# Patient Record
Sex: FEMALE
[De-identification: names, ages, dates, MRNs, and addresses within clinical notes are randomized per-mention and may not be internally consistent; named-entity substitution may affect disease eponyms.]

---

## 2019-06-02 ENCOUNTER — HOSPITAL ENCOUNTER (EMERGENCY)
Dept: HOSPITAL 42 - ED | Age: 34
Discharge: LEFT BEFORE DISCHARGE | End: 2019-06-02
Payer: COMMERCIAL

## 2019-06-02 VITALS — TEMPERATURE: 98.6 F

## 2019-06-02 VITALS
OXYGEN SATURATION: 100 % | HEART RATE: 82 BPM | RESPIRATION RATE: 16 BRPM | SYSTOLIC BLOOD PRESSURE: 143 MMHG | DIASTOLIC BLOOD PRESSURE: 92 MMHG

## 2019-06-02 VITALS — BODY MASS INDEX: 38.4 KG/M2

## 2019-06-02 DIAGNOSIS — M54.9: ICD-10-CM

## 2019-06-02 DIAGNOSIS — F17.210: ICD-10-CM

## 2019-06-02 DIAGNOSIS — I10: ICD-10-CM

## 2019-06-02 DIAGNOSIS — R10.13: Primary | ICD-10-CM

## 2019-06-02 LAB
ALBUMIN SERPL-MCNC: 4.5 G/DL (ref 3–4.8)
ALBUMIN/GLOB SERPL: 1.4 {RATIO} (ref 1.1–1.8)
ALT SERPL-CCNC: 34 U/L (ref 7–56)
APPEARANCE UR: (no result)
APTT BLD: 31.7 SECONDS (ref 26.9–38.3)
AST SERPL-CCNC: 25 U/L (ref 14–36)
BACTERIA #/AREA URNS HPF: (no result) /HPF
BASOPHILS # BLD AUTO: 0.01 K/MM3 (ref 0–2)
BASOPHILS NFR BLD: 0.1 % (ref 0–3)
BILIRUB UR-MCNC: NEGATIVE MG/DL
BUN SERPL-MCNC: 11 MG/DL (ref 7–21)
CALCIUM SERPL-MCNC: 9.2 MG/DL (ref 8.4–10.5)
COLOR UR: YELLOW
EOSINOPHIL # BLD: 0.2 10*3/UL (ref 0–0.7)
EOSINOPHIL NFR BLD: 2.1 % (ref 1.5–5)
ERYTHROCYTE [DISTWIDTH] IN BLOOD BY AUTOMATED COUNT: 12.6 % (ref 11.5–14.5)
GFR NON-AFRICAN AMERICAN: > 60
GLUCOSE UR STRIP-MCNC: NEGATIVE MG/DL
HCG,QUALITATIVE URINE: NEGATIVE
HGB BLD-MCNC: 14.8 G/DL (ref 12–16)
INR PPP: 1.18
LEUKOCYTE ESTERASE UR-ACNC: NEGATIVE LEU/UL
LIPASE SERPL-CCNC: 53 U/L (ref 23–300)
LYMPHOCYTES # BLD: 1.6 10*3/UL (ref 1.2–3.4)
LYMPHOCYTES NFR BLD AUTO: 19.8 % (ref 22–35)
MCH RBC QN AUTO: 27.3 PG (ref 25–35)
MCHC RBC AUTO-ENTMCNC: 32.7 G/DL (ref 31–37)
MCV RBC AUTO: 83.2 FL (ref 80–105)
MONOCYTES # BLD AUTO: 0.5 10*3/UL (ref 0.1–0.6)
MONOCYTES NFR BLD: 5.7 % (ref 1–6)
PH UR STRIP: 7.5 [PH] (ref 4.7–8)
PLATELET # BLD: 259 10^3/UL (ref 120–450)
PMV BLD AUTO: 9.9 FL (ref 7–11)
PROT UR STRIP-MCNC: 100 MG/DL
PROTHROMBIN TIME: 13.3 SECONDS (ref 9.4–12.5)
RBC # BLD AUTO: 5.43 10^6/UL (ref 3.5–6.1)
RBC # UR STRIP: NEGATIVE /UL
RBC #/AREA URNS HPF: (no result) /HPF (ref 0–2)
SP GR UR STRIP: 1.02 (ref 1–1.03)
TROPONIN I SERPL-MCNC: < 0.01 NG/ML
UROBILINOGEN UR STRIP-ACNC: 0.2 E.U./DL
WBC # BLD AUTO: 7.9 10^3/UL (ref 4.5–11)
WBC #/AREA URNS HPF: (no result) /HPF (ref 0–6)

## 2019-06-02 PROCEDURE — 85025 COMPLETE CBC W/AUTO DIFF WBC: CPT

## 2019-06-02 PROCEDURE — 83690 ASSAY OF LIPASE: CPT

## 2019-06-02 PROCEDURE — 85730 THROMBOPLASTIN TIME PARTIAL: CPT

## 2019-06-02 PROCEDURE — 83735 ASSAY OF MAGNESIUM: CPT

## 2019-06-02 PROCEDURE — 93005 ELECTROCARDIOGRAM TRACING: CPT

## 2019-06-02 PROCEDURE — 81001 URINALYSIS AUTO W/SCOPE: CPT

## 2019-06-02 PROCEDURE — 84703 CHORIONIC GONADOTROPIN ASSAY: CPT

## 2019-06-02 PROCEDURE — 96361 HYDRATE IV INFUSION ADD-ON: CPT

## 2019-06-02 PROCEDURE — 85610 PROTHROMBIN TIME: CPT

## 2019-06-02 PROCEDURE — 71045 X-RAY EXAM CHEST 1 VIEW: CPT

## 2019-06-02 PROCEDURE — 99285 EMERGENCY DEPT VISIT HI MDM: CPT

## 2019-06-02 PROCEDURE — 96374 THER/PROPH/DIAG INJ IV PUSH: CPT

## 2019-06-02 PROCEDURE — 84484 ASSAY OF TROPONIN QUANT: CPT

## 2019-06-02 PROCEDURE — 80053 COMPREHEN METABOLIC PANEL: CPT

## 2019-06-02 PROCEDURE — 81025 URINE PREGNANCY TEST: CPT

## 2019-06-02 PROCEDURE — 96375 TX/PRO/DX INJ NEW DRUG ADDON: CPT

## 2019-06-02 NOTE — RAD
Date of service: 



06/02/2019



HISTORY:

 abd pain 



COMPARISON:

No prior. 



TECHNIQUE:

1 view obtained.



FINDINGS:



LUNGS:

No active pulmonary disease.



PLEURA:

No significant pleural effusion identified, no pneumothorax apparent.



CARDIOVASCULAR:

No aortic atherosclerotic calcification present.



Normal cardiac size. No pulmonary vascular congestion. 



OSSEOUS STRUCTURES:

No significant abnormalities.



VISUALIZED UPPER ABDOMEN:

Normal.



OTHER FINDINGS:

None.



IMPRESSION:

No active disease.

## 2019-06-02 NOTE — ED PDOC
Arrival/HPI





- General


Chief Complaint: Abdominal Pain


Time Seen by Provider: 19 09:33


Historian: Patient





- History of Present Illness


Narrative History of Present Illness (Text): 


35 y/o female with PMH of GERD and anxiety presents to the ED c/o abdominal pain

x 1 day. Abdominal pain is sharp, epigastric, with radiation into her back. 

Associated bloating and nausea. Symptoms began yesterday after a vodka soda and 

salad at dinner. Back pain is worse with movement, and she thinks she may have 

strained herself picking up heavy laundry bags the last 2 days. She saw her PMD 

early yesterday for sinus congestion and was prescribed an antibiotic that she 

has not started yet. Pt has had problems with GERD in the past and was referred 

for an endoscopy last year but never followed up. No recent travel, sick 

contacts, or recent antibiotic use. Pt has not taken any medication for pain. 

Denies fever, chills, vomiting, diarrhea, constipation, cough, chest pain, SOB, 

urinary symptoms, vaginal bleeding/odor, saddle anesthesia, bowel/bladder 

incontinence, dizziness, or any other associated symptoms.





Past Medical History





- Provider Review


Nursing Documentation Reviewed: Yes





- Cardiac


Hx Cardiac Disorders: Yes


Hx Hypertension: Yes





- Pulmonary


Hx Respiratory Disorders: No





- Neurological


Hx Neurological Disorder: No





- HEENT


Hx HEENT Disorder: No





- Renal


Hx Renal Disorder: No





- Endocrine/Metabolic


Hx Endocrine Disorders: No





- Hematological/Oncological


Hx Blood Disorders: No





- Integumentary


Hx Dermatological Disorder: No





- Musculoskeletal/Rheumatological


Hx Musculoskeletal Disorders: No





- Gastrointestinal


Hx Gastrointestinal Disorders: No





- Genitourinary/Gynecological


Hx Genitourinary Disorders: No





- Psychiatric


Hx Psychophysiologic Disorder: Yes


Hx Anxiety: Yes


Hx Substance Use: No





- Surgical History


Hx  Section: Yes





- Anesthesia


Hx Anesthesia: No





Family/Social History





- Physician Review


Nursing Documentation Reviewed: Yes


Family/Social History: No Known Family HX


Smoking Status: Heavy Smoker > 10 Cigarettes Daily


Hx Alcohol Use: Yes


Frequency of alcohol use: Socially


Hx Substance Use: No





Allergies/Home Meds


Allergies/Adverse Reactions: 


Allergies





No Known Allergies Allergy (Verified 19 09:50)


   











Review of Systems





- Review of Systems


Constitutional: Normal.  absent: Fevers


Eyes: Normal.  absent: Vision Changes


ENT: Sinus Congestion.  absent: Sore Throat


Respiratory: Normal.  absent: SOB, Cough


Cardiovascular: Normal.  absent: Chest Pain, Palpitations, Syncope


Gastrointestinal: Abdominal Pain, Nausea, Appetite Changes.  absent: Stool 

Changes, Constipation, Diarrhea, Vomiting, Hematochezia, Hematemesis, Other (no 

melena)


Genitourinary Female: Normal.  absent: Dysuria, Frequency, Vaginal Bleeding, 

Vaginal Discharge


Musculoskeletal: Back Pain.  absent: Neck Pain


Skin: Normal.  absent: Rash


Neurological: Normal.  absent: Headache, Dizziness


Psychiatric: Anxiety





Physical Exam


Vital Signs Reviewed: Yes





Vital Signs











  Temp Pulse Resp BP Pulse Ox


 


 19 09:28  98.6 F  88  17  176/88 H  99











Temperature: Afebrile


Blood Pressure: Hypertensive


Pulse: Regular


Respiratory Rate: Normal


Appearance: Positive for: Well-Appearing, Non-Toxic, Comfortable


Pain Distress: None


Mental Status: Positive for: Alert and Oriented X 3





- Systems Exam


Head: Present: Atraumatic, Normocephalic


Pupils: Present: PERRL


Extroacular Muscles: Present: EOMI


Conjunctiva: Present: Normal


Mouth: Present: Moist Mucous Membranes


Neck: Present: Normal Range of Motion


Respiratory/Chest: Present: Clear to Auscultation, Good Air Exchange.  No: R

espiratory Distress, Accessory Muscle Use


Cardiovascular: Present: Regular Rate and Rhythm, Normal S1, S2


Abdomen: Present: Tenderness (epigastric, mild), Normal Bowel Sounds.  No: 

Distention, Peritoneal Signs, Rebound, Guarding


Back: Present: Normal Inspection.  No: CVA Tenderness, Midline Tenderness, 

Paraspinal Tenderness


Upper Extremity: Present: Normal Inspection, Normal ROM.  No: Cyanosis, Edema


Lower Extremity: Present: Normal Inspection, NORMAL PULSES.  No: Edema


Neurological: Present: GCS=15, Speech Normal, Motor Func Grossly Intact, Normal 

Sensory Function, Gait Normal


Skin: Present: Warm, Dry, Normal Color.  No: Rashes


Psychiatric: Present: Alert, Oriented x 3, Normal Insight, Normal Concentration,

Anxious





Medical Decision Making


ED Course and Treatment: 


Initial Plan:


* Labs


* UA, POC


* EKG


* CXR


* CT Abd/Pelvis


* IVF, Protonix, Zofran





10:45


Bloodwork reviewed unremarkable


Urine reviewed, unremarkable


CXR shows no active disease


EKG shows no acute ischemic change


POC negative





Patient reports resolution of pain with medication.





11:13


Pt asking to leave AMA prior to CT imaging, states she has to go take care of 

her kids at home. Advised GI and PMD followup. AMA form discussed with pt 

including risks of leaving before abdominal imaging. Pt verbalized 

understanding. Form signed by patient, me and witnessed by nursing staff.





The patient is choosing to leave against medical advice.  I have personally 

explained to the patient that choosing to do so may result in permanent bodily 

harm, disability, or death.  I have discussed at great length that without 

further evaluation and monitoring there may be unforeseen circumstances and/or 

deterioration causing permanent bodily harm or death as a result of their 

choice. The patient is alert, oriented, and shows the mental capacity to make 

clear decisions regarding the patients health care at this time. The patient 

continues to wish to leave against medical advice.  


In light of the patients decision to leave against medical advice, follow-up 

has been arranged and the patient is aware of the importance to following up as 

instructed.  The patient has been advised that they should return to the 

emergency room immediately if they change their mind at any time, or if their 

condition begins to change or worsen in any way.











- Lab Interpretations


Lab Results: 














                                 19 10:30 





                                 19 10:30 





                                   Lab Results





19 10:30: Urine Color Yellow, Urine Appearance Sl cloudy, Urine pH 7.5, Ur

Specific Gravity 1.020, Urine Protein 100 H, Urine Glucose (UA) Negative, Urine 

Ketones Trace H, Urine Blood Negative, Urine Nitrate Negative, Urine Bilirubin 

Negative, Urine Urobilinogen 0.2, Ur Leukocyte Esterase Negative, Urine RBC 

None, Urine WBC 0 - 2, Ur Epithelial Cells 4 - 5, Urine Bacteria Mod, Urine HCG,

Qual Negative


19 10:30: Sodium 140, Potassium 4.1, Chloride 105, Carbon Dioxide 27, 

Anion Gap 13, BUN 11, Creatinine 0.7, Est GFR (African Amer) > 60, Est GFR (Non-

Af Amer) > 60, Random Glucose 112 H, Calcium 9.2, Magnesium 2.2, Total Bilirubin

0.6, AST 25, ALT 34, Alkaline Phosphatase 66, Troponin I < 0.01, Total Protein 

7.6, Albumin 4.5, Globulin 3.2, Albumin/Globulin Ratio 1.4, Lipase 53


19 10:30: PT 13.3 H, INR 1.18, APTT 31.7


19 10:30: WBC 7.9, RBC 5.43, Hgb 14.8, Hct 45.2, MCV 83.2, MCH 27.3, MCHC 

32.7, RDW 12.6, Plt Count 259, MPV 9.9, Neut % (Auto) 72.3 H, Lymph % (Auto) 

19.8 L, Mono % (Auto) 5.7, Eos % (Auto) 2.1, Baso % (Auto) 0.1, Lymph # (Auto) 

1.6, Mono # (Auto) 0.5, Eos # (Auto) 0.2, Baso # (Auto) 0.01, Absolute Neuts 

(auto) 5.72








I have reviewed the lab results: Yes





- RAD Interpretation


Narrative RAD Interpretations (Text): 





19 11:31


CXR:


FINDINGS:


LUNGS:


No active pulmonary disease.


PLEURA:


No significant pleural effusion identified, no pneumothorax apparent.


CARDIOVASCULAR:


No aortic atherosclerotic calcification present.


Normal cardiac size. No pulmonary vascular congestion. 


OSSEOUS STRUCTURES:


No significant abnormalities.


VISUALIZED UPPER ABDOMEN:


Normal.


OTHER FINDINGS:


None.


IMPRESSION:


No active disease.














Radiology Orders: 











19 10:02


CHEST PORTABLE [RAD] Stat 





19 10:03


ABD & PELVIS IV CONTRAST ONLY [CT] Stat 











: Radiologist





- EKG Interpretation


EKG Interpretation (Text): 





19 11:31


Rate 69; NSR; Normal intervals; No STEMI or other signs of ischemic disease


Interpreted by ED Physician: Yes


Type: 12 lead EKG





- Medication Orders


Current Medication Orders: 











Sodium Chloride (Sodium Chloride 0.9%)  1,000 mls @ 1,000 mls/hr IV .Q1H STA


   Stop: 19 11:01


   Last Admin: 19 10:13  Dose: 1,000 mls/hr





eMAR Start Stop


 Document     19 10:13  CD  (Rec: 19 10:13  CD  [facility] [place])


     Intravenous Solution


      Start Date                                 19


      Start Time                                 10:13


      End Date                                   19


      End time                                   11:13


      Total Infusion Time                        60








Discontinued Medications





Ondansetron HCl (Zofran Inj)  4 mg IVP STAT STA


   Stop: 19 10:03


   Last Admin: 19 10:13  Dose: 4 mg





IVP Administration


 Document     19 10:13  CD  (Rec: 19 10:13  [facility]


     Charges for Administration


      # of IVP Administrations                   1





Pantoprazole Sodium (Protonix Inj)  40 mg IVP STAT STA


   Stop: 19 10:03


   Last Admin: 19 10:13  Dose: 40 mg





IVP Administration


 Document     19 10:13  CD  (Rec: 19 10:13  [facility]  [facility]


     Charges for Administration


      # of IVP Administrations                   1














Disposition/Present on Arrival





- Present on Arrival


Any Indicators Present on Arrival: No


History of DVT/PE: No


History of Uncontrolled Diabetes: No


Urinary Catheter: No


History of Decub. Ulcer: No


History Surgical Site Infection Following: None





- Disposition


Have Diagnosis and Disposition been Completed?: Yes


Diagnosis: 


 Abdominal pain, Back pain, Left against medical advice





Disposition: AGAINST MEDICAL ADVICE


Disposition Time: 11:13


Patient Plan: Discharge


Condition: GUARDED


Discharge Instructions (ExitCare):  Acid Reflux (Gastroesophageal Reflux Diseas

e), Adult (DC), Acute Abdomen (Belly Pain), Adult (DC), Leaving Against Medical 

Advice


Additional Instructions: 


Return to ED if symptoms worsen/persist, new symptoms arise, or if you wish to 

be re-evaluated


Followup with GI doctor and primary doctor within 2 days


Pepcid every 12 hours as needed for heart burn


[place] diet, increase fluids


Rest, no strenuous activity


Prescriptions: 


Famotidine [Pepcid] 20 mg PO Q12H PRN #30 tab


 PRN Reason: Indigestion


Referrals: 


Pako Quinonez DO [Staff Provider] - Follow up with primary


Forms:  [employer] (English)

## 2019-06-02 NOTE — CARD
--------------- APPROVED REPORT --------------





Date of service: 06/02/2019



EKG Measurement

Heart [id number]

ME [id number]

[id number]

[id number]

[id number]



<Conclusion>

Normal sinus rhythm

Normal ECG